# Patient Record
(demographics unavailable — no encounter records)

---

## 2025-02-06 NOTE — REVIEW OF SYSTEMS
[Sore Throat] : no sore throat [Anxiety] : no anxiety [Depression] : no depression [Negative] : Gastrointestinal [FreeTextEntry4] : sensation of food stuck in throat [FreeTextEntry7] : normal appetite

## 2025-02-06 NOTE — ASSESSMENT
[FreeTextEntry1] : # HM f/u AV labs Prevnar 20 given today Pt doesn't get routine breast imaging anymore after breast cancer and b/l mastectomy Due for cervical ca screening - referred to GYN  # Globus sensation Given she has only had this 1-1.5 weeks and has no red flag signs/symptoms will monitor for now If symptoms persist to 1 month+ or worsen will refer to ENT for possible laryngoscopy and to GI for possible upper endoscopy +/- imaging  # Hx breast and ovarian ca, chemo-induced neuropathy Following yearly w/ heme/onc Doing well with gabapentin  f/u in 1 year or PRN

## 2025-02-06 NOTE — HEALTH RISK ASSESSMENT
[Yes] : Yes [2 - 4 times a month (2 pts)] : 2-4 times a month (2 points) [1 or 2 (0 pts)] : 1 or 2 (0 points) [Never (0 pts)] : Never (0 points) [No] : In the past 12 months have you used drugs other than those required for medical reasons? No [0] : 2) Feeling down, depressed, or hopeless: Not at all (0) [PHQ-2 Negative - No further assessment needed] : PHQ-2 Negative - No further assessment needed [Patient declined mammogram] : Patient declined mammogram [Patient reported PAP Smear was normal] : Patient reported PAP Smear was normal [Patient reported bone density results were abnormal] : Patient reported bone density results were abnormal [Patient reported colonoscopy was normal] : Patient reported colonoscopy was normal [Former] : Former [10-14] : 10-14 [> 15 Years] : > 15 Years [Audit-CScore] : 2 [de-identified] : plays tennis, walks a dog routinely [de-identified] : very healthy [[id number]] : 0 [MammogramComments] : s/p b/l mastectomies [PapSmearDate] : 02/19 [BoneDensityDate] : 02/19 [BoneDensityComments] : osteopenia [ColonoscopyDate] : 02/19 [ColonoscopyComments] : virtual colonoscopy, normal. Couldn't do regular cscope bc of abd scar tissue not allowing passage of scope. [de-identified] : 1 PPD x 14 yrs

## 2025-02-06 NOTE — HEALTH RISK ASSESSMENT
[Yes] : Yes [2 - 4 times a month (2 pts)] : 2-4 times a month (2 points) [1 or 2 (0 pts)] : 1 or 2 (0 points) [Never (0 pts)] : Never (0 points) [No] : In the past 12 months have you used drugs other than those required for medical reasons? No [0] : 2) Feeling down, depressed, or hopeless: Not at all (0) [PHQ-2 Negative - No further assessment needed] : PHQ-2 Negative - No further assessment needed [Patient declined mammogram] : Patient declined mammogram [Patient reported PAP Smear was normal] : Patient reported PAP Smear was normal [Patient reported bone density results were abnormal] : Patient reported bone density results were abnormal [Patient reported colonoscopy was normal] : Patient reported colonoscopy was normal [Former] : Former [10-14] : 10-14 [> 15 Years] : > 15 Years [Audit-CScore] : 2 [de-identified] : plays tennis, walks a dog routinely [de-identified] : very healthy [[id number]] : 0 [MammogramComments] : s/p b/l mastectomies [PapSmearDate] : 02/19 [BoneDensityDate] : 02/19 [BoneDensityComments] : osteopenia [ColonoscopyDate] : 02/19 [ColonoscopyComments] : virtual colonoscopy, normal. Couldn't do regular cscope bc of abd scar tissue not allowing passage of scope. [de-identified] : 1 PPD x 14 yrs

## 2025-02-06 NOTE — HISTORY OF PRESENT ILLNESS
[FreeTextEntry1] : est care, CPE [de-identified] : Pt comes in to est care and get CPE.  She feels like there's something she can't swallow - there's something in her throat. She has had breast and ovarian cancers and melanoma. In remission for all of these, now everything that's not normal she is concerned is cancer. Has been feeling this for past 7-10 days. At first she thought a bit of food was stuck in her throat, drinking water didn't resolve. No fever/chills, cough, regurgitating undigested food. Has history of sinus issues for years. She has seen ENTs previously. Thinks symptoms are getting a little worse, but could just be focusing on it. [FreeTextEntry8] : Pt comes in to discuss ENT issues.

## 2025-02-06 NOTE — PHYSICAL EXAM
[Normal TMs] : both tympanic membranes were normal [No Lymphadenopathy] : no lymphadenopathy [Supple] : supple [Thyroid Normal, No Nodules] : the thyroid was normal and there were no nodules present [Normal] : normal rate, regular rhythm, normal S1 and S2 and no murmur heard [No Edema] : there was no peripheral edema [No Extremity Clubbing/Cyanosis] : no extremity clubbing/cyanosis [Soft] : abdomen soft [Non Tender] : non-tender [Non-distended] : non-distended [No Masses] : no abdominal mass palpated [Normal Supraclavicular Nodes] : no supraclavicular lymphadenopathy [Normal Anterior Cervical Nodes] : no anterior cervical lymphadenopathy [Normal Gait] : normal gait [Speech Grossly Normal] : speech grossly normal [Normal Affect] : the affect was normal [Alert and Oriented x3] : oriented to person, place, and time [Normal Mood] : the mood was normal [Normal Insight/Judgement] : insight and judgment were intact

## 2025-02-06 NOTE — HISTORY OF PRESENT ILLNESS
[FreeTextEntry1] : est care, CPE [de-identified] : Pt comes in to est care and get CPE.  She feels like there's something she can't swallow - there's something in her throat. She has had breast and ovarian cancers and melanoma. In remission for all of these, now everything that's not normal she is concerned is cancer. Has been feeling this for past 7-10 days. At first she thought a bit of food was stuck in her throat, drinking water didn't resolve. No fever/chills, cough, regurgitating undigested food. Has history of sinus issues for years. She has seen ENTs previously. Thinks symptoms are getting a little worse, but could just be focusing on it. [FreeTextEntry8] : Pt comes in to discuss ENT issues.

## 2025-02-27 NOTE — CONSULT LETTER
[Dear  ___] : Dear  [unfilled], [Consult Letter:] : I had the pleasure of evaluating your patient, [unfilled]. [Please see my note below.] : Please see my note below. [Consult Closing:] : Thank you very much for allowing me to participate in the care of this patient.  If you have any questions, please do not hesitate to contact me. [Sincerely,] : Sincerely, [FreeTextEntry3] : Edson Chavez MD

## 2025-02-27 NOTE — HISTORY OF PRESENT ILLNESS
[de-identified] : Idalia Gutierrez is a 68 yo female who was referred by Dr. Reid for evaluation of globus sensation. She has history of breast and ovarian cancer. She notes globus sensation for the past month. She denies dysphagia, odynophagia, dyspnea, dysphonia, heartburn, or aspiration episodes. She denies foreign body ingestion. No recent fevers or chills. She denies unintentional weight loss or neck masses.

## 2025-02-27 NOTE — REVIEW OF SYSTEMS
[Sneezing] : sneezing [Post Nasal Drip] : post nasal drip [Sinus Pressure] : sinus pressure [Throat Pain] : throat pain [Negative] : Heme/Lymph [de-identified] : Headaches

## 2025-02-27 NOTE — ASSESSMENT
[FreeTextEntry1] : Idalia Gutierrez presents for evaluation of globus sensation for the past month. Flexible laryngoscopy was performed showing moderate to severe postcricoid inflammation, indicating laryngopharyngeal reflux.  We discussed antireflux precautions and handout was provided.  - f/u in 1 mo

## 2025-04-02 NOTE — HISTORY OF PRESENT ILLNESS
[de-identified] : Idalia Gutierrez is a 66 yo female who was referred by Dr. Reid for evaluation of globus sensation. She has history of breast and ovarian cancer. She notes globus sensation for the past month. She denies dysphagia, odynophagia, dyspnea, dysphonia, heartburn, or aspiration episodes. She denies foreign body ingestion. No recent fevers or chills. She denies unintentional weight loss or neck masses. [FreeTextEntry1] : 4/2/25 - Ms. Gutierrez presents for follow-up. She has implemented some antireflux precautions. She notes improvement in globus sensation. She denies dysphagia, odynophagia ,dyspnea, dysphonia, heartburn. She notes increased sinus pressure and nasal congestion. She notes epiphora. She denies rhinorrhea or postnasal drainage. She denies history of recurrent sinus infections. She has nto had allergy testing. She does not regularly use nasal sprays.

## 2025-04-02 NOTE — PHYSICAL EXAM
[] : septum deviated to the right [de-identified] : Bilateral inferior turbinate hypertrophy [Midline] : trachea located in midline position [Normal] : no rashes

## 2025-04-02 NOTE — ASSESSMENT
[FreeTextEntry1] : Idalia Gutierrez presents for follow-up. She implemented antireflux precautions and has had improvement in globus sensation. She notes worsened chronic rhinitis symptoms and has bilateral inferior turbinate hypertrophy with septal deviation on exam, no sign of infection.   - continue antireflux precautions - start daily oral antihistamine - start flonase 2 sprays to each nostril BID x 1 mo - start medrol dose pack. The potential side effects of high-dose steroid use were discussed at length. These risks include but are not limited to: increased appetite, insomnia, fluid retention, mood swings, weight gain, change in blood pressure, high blood glucose, possible adrenal suppression, osteoporosis, avascular necrosis of the hip, menstrual irregularities (if applicable), and cataracts - f/u prn